# Patient Record
Sex: FEMALE | Race: WHITE | NOT HISPANIC OR LATINO | Employment: UNEMPLOYED | URBAN - METROPOLITAN AREA
[De-identification: names, ages, dates, MRNs, and addresses within clinical notes are randomized per-mention and may not be internally consistent; named-entity substitution may affect disease eponyms.]

---

## 2023-04-06 ENCOUNTER — HOSPITAL ENCOUNTER (OUTPATIENT)
Dept: DATA CONVERSION | Facility: HOSPITAL | Age: 48
End: 2023-04-06
Attending: SURGERY | Admitting: SURGERY
Payer: COMMERCIAL

## 2023-04-06 DIAGNOSIS — D05.12 INTRADUCTAL CARCINOMA IN SITU OF LEFT BREAST: ICD-10-CM

## 2023-04-06 DIAGNOSIS — Z90.12 ACQUIRED ABSENCE OF LEFT BREAST AND NIPPLE: ICD-10-CM

## 2023-04-06 LAB
HEMATOCRIT (%) IN BLOOD BY AUTOMATED COUNT: 40.9 % (ref 36–46)
HEMOGLOBIN (G/DL) IN BLOOD: 12.9 G/DL (ref 12–16)

## 2023-04-14 LAB
COMPLETE PATHOLOGY REPORT: NORMAL
CONVERTED CLINICAL DIAGNOSIS-HISTORY: NORMAL
CONVERTED FINAL DIAGNOSIS: NORMAL
CONVERTED FINAL REPORT PDF LINK TO COPY AND PASTE: NORMAL
CONVERTED GROSS DESCRIPTION: NORMAL

## 2023-06-02 LAB
GENETICS TEST NAME-DATA CONVERSION: NORMAL
LAB MOLECULAR CA TECHNICAL NOTES: NORMAL

## 2023-09-06 VITALS — BODY MASS INDEX: 21.87 KG/M2 | HEIGHT: 67 IN | WEIGHT: 139.33 LBS

## 2023-09-14 NOTE — H&P
"    History & Physical Reviewed:   Pregnant/Lactating:  ·  Are You Pregnant no (1)   ·  Are You Currently Breastfeeding no (1)     I have reviewed the History and Physical dated:  06-Apr-2023   History and Physical reviewed and relevant findings noted. Patient examined to review pertinent physical  findings.: No significant changes   Home Medications Reviewed: no changes noted   Allergies Reviewed: no changes noted       ERAS (Enhanced Recovery After Surgery):  ·  ERAS Patient: no     Consent:   COVID-19 Consent:  ·  COVID-19 Risk Consent Surgeon has reviewed key risks related to the risk of lior COVID-19 and if they contract COVID-19 what the risks are.       Electronic Signatures:  Sofia Quiroz)  (Signed 06-Apr-2023 07:24)   Authored: History & Physical Reviewed, ERAS, Consent,  Note Completion      Last Updated: 06-Apr-2023 07:24 by Sofia Quiroz)    References:  1.  Data Referenced From \"Patient Profile - Preop v3\" 05-Apr-2023 12:52   "

## 2023-10-02 NOTE — OP NOTE
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 left breast partial mastectomy, re-excision   Surgeon: Gabriella   Resident/Fellow/Other Assistant: Beena   Estimated Blood Loss (mL): < 30 cc   Specimen: yes   Findings: clearly defined old seroma cavity     Operative Report Dictated:  Dictation: not applicable - note contains Operative  Report   Operative Report:    OPERATIVE PROCEDURE: Informed consent was obtained. T he surgical site marked and the Day of Surgery Update completed. The patient was taken to the operating room and positioned in a supine position on the operating room table. Anesthesia was induced without difficulty. SCDs were placed for DVT prophylaxis. Antibiotics  were administered within 60 minutes prior to incision for surgical prophylaxis. Lower body Darlin Hugger was applied to help maintain normothermia.     I reviewed my note and the appropriate films.  A surgical safety time-out was performed.     The patient was sterilely prepped and draped in the usual fashion.       I then turned my attention to the left breast. The skin and surrounding  tissue was anesthetized with local anesthetic. Her circumareolar incision was made with a 15 blade scalpel.  Subcutaneous tissues  were sharply divided down to and into subcutaneous fat using Bovie cautery.  I dissected towards the old cavity.  Serous fluid was  encountered and suctioned.  I excised additional lateral margin and oriented the new margin with a stitch.    Hemostasis was achieved with bovie.  The deep dermal layer was  closed with interrupted 3-0 vicryl sutures.  The skin was closed with a running 4-0 monocryl subcuticular stitch.     A sterile dressing was applied.  A surgical bra was used for light compression.     Anesthesia was reversed and the patient was brought to the recovery room in stable condition having tolerated the procedure well.  There were no  complications.  30 cc of 1% lidocaine/0.25%  marcaine mixed was used throughout the case.           Attestation:   Note Completion:  Attending Attestation I performed the procedure without a resident         Electronic Signatures:  Sofia Quiroz)  (Signed 06-Apr-2023 08:13)   Authored: Post Operative Note, Note Completion      Last Updated: 06-Apr-2023 08:13 by Sofia Quiroz)

## 2023-12-04 RX ORDER — LEVONORGESTREL 52 MG/1
1 INTRAUTERINE DEVICE INTRAUTERINE ONCE
COMMUNITY

## 2023-12-04 RX ORDER — NARATRIPTAN 2.5 MG/1
TABLET ORAL ONCE AS NEEDED
COMMUNITY
Start: 2023-01-09

## 2023-12-04 RX ORDER — CEPHALEXIN 500 MG/1
500 TABLET ORAL 3 TIMES DAILY
COMMUNITY
Start: 2023-03-11 | End: 2023-12-20 | Stop reason: WASHOUT

## 2023-12-11 ENCOUNTER — APPOINTMENT (OUTPATIENT)
Dept: SURGICAL ONCOLOGY | Facility: CLINIC | Age: 48
End: 2023-12-11
Payer: COMMERCIAL

## 2023-12-11 ENCOUNTER — APPOINTMENT (OUTPATIENT)
Dept: RADIOLOGY | Facility: CLINIC | Age: 48
End: 2023-12-11
Payer: COMMERCIAL

## 2023-12-18 NOTE — PROGRESS NOTES
*Chief Complaint     FU  Left breast DCIS     History of Present IllnessReferred by PUSHPA moise  PCP jenny freeman MD     48-year-old  female here for FU  Left breast DCIS  She presents today with her .     History:  1) No abnormal mammograms, breast biopsies or breast surgeries  2) November 29, 2022. Bilateral screening mammogram at [facility]. Breast tissue is extremely dense. Right breast negative. Left breast calcifications, BI-RADS 0.  3) December 15, 2022. Left breast diagnostic imaging. Pleomorphic calcifications are noted in the upper outer breast, BI-RADS 4.  4) January 5, 2023. Left breast stereotactic biopsy. Final pathology reveals atypical ductal hyperplasia. Concordant. Surgical consultation is recommended. Open coil hydro-Sony in position  5) 2/9/2023. left breast excisional biopsy. 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 1.2 cm by volume. 3 out of 9 blocks with DCIS. Grade 2. DCIS was split within microns of the posterior margin and less than 1 mm from the lateral margin ER 90%, WI 75%. pTis  6) 3/9/2023. left breast re excision. posterior no residual disease. lateral DCIS at margin.   7) 4/6/2023. left re-excision. residual DCIS more than 2 mm from final margin.   8) completed XRT  9) tamoxifen (jose maria).     Here for FU  BL Mg today     Ob/gyn history  menarche 16  menopause premenopausal. Mirena in place  G 2P3 , age of first delivery 30  brief OCPs, no fertility treatments, no HRT     Paternal grandmother breast cancer in her 60s        Review of Systems  A comprehensive ROS was taken on the patient intake form and reviewed with the patient. This form is scanned into the electronic medical record.     Constitutional symptoms: Denies generalized fatigue. Denies weight change, fevers/chills, difficulty sleeping   Eyes: Denies double vision, glaucoma, cataracts.  Ear/nose/throat/mouth: Denies hearing changes, sore  throat, sinus problems.  Cardiovascular: No chest pain. Denies irregular heartbeat. Denies ankle swelling.  Respiratory: No wheezing, cough, or shortness of breath.  Gastrointestinal: No abdominal pain, No nausea/vomiting. No indigestion/heartburn. No change in bowel habits. No constipation or diarrhea.   Genitourinary: No urinary incontinence. No urinary frequency. No painful urination.  Musculoskeletal: No bone pain, no muscle pain, no joint pain.   Integumentary: No rash. No masses. No changes in moles. No easy bruising.  Neurological: No headaches. No tremors. No numbness/tingling.  Psychiatric: No anxiety. No depression.  Endocrine: No excessive thirst. Not too hot or too cold. Not tired or fatigued.   Hematological/lymphatic: No swollen glands or blood clotting problems. No bruising.      Physical Exam  A chaperone was offered for all portions of the physical exam. The patient declined.      General appearance: appears stated age, alert and oriented x 3  Head: Normocephalic, atraumatic  Eyes: conjunctivae/corneas clear.  Ears: External ears are normal, hearing is grossly intact.  Lungs: normal breathing  Heart: regular   Abdomen: Soft, nontender, nondistended.  Neurologic: grossly normal  Lymph nodes: No cervical, supraclavicular or axillary lymphadenopathy bilaterally     Breast: A comprehensive breast exam was performed in the seated and supine positions. Breasts are symmetrical. Bilateral nipples are everted. There are no skin changes on arm maneuvers. Bra size: A            Right: no masses            Left: no masses. Healed incision. Radiation changes.     Provider Impressions     1) 48-year-old  female here w new left breast DCIS, ER/ID positive. close posterior and lateral margin now s/p left re-excision, persistent positive lateral margin. s/p left PM re-excision, residual DCIS, final margin neg. Completed XRT. Tamoxifen.  2) otherwise healthy. Non-smoker. mirena recent removed.   3) paternal  grandmother with breast cancer in her 60s      *Patient Discussion/Summary     She is here alone. She completed XRT. She is tolerating tamoxifen. Clinical exam is normal. BL MG today is negative for suspicious findings.  She is happy to hear this.  We discussed FU in 1 year after her mammogram.  We can discuss supplemental screening with MRI after that visit. She should call w any sooner concerns.

## 2023-12-20 ENCOUNTER — ANCILLARY PROCEDURE (OUTPATIENT)
Dept: RADIOLOGY | Facility: CLINIC | Age: 48
End: 2023-12-20
Payer: COMMERCIAL

## 2023-12-20 ENCOUNTER — OFFICE VISIT (OUTPATIENT)
Dept: SURGICAL ONCOLOGY | Facility: CLINIC | Age: 48
End: 2023-12-20
Payer: COMMERCIAL

## 2023-12-20 VITALS
WEIGHT: 135 LBS | HEART RATE: 55 BPM | SYSTOLIC BLOOD PRESSURE: 107 MMHG | DIASTOLIC BLOOD PRESSURE: 69 MMHG | BODY MASS INDEX: 21.16 KG/M2

## 2023-12-20 DIAGNOSIS — Z85.3 PERSONAL HISTORY OF MALIGNANT NEOPLASM OF BREAST: ICD-10-CM

## 2023-12-20 DIAGNOSIS — D05.12 DUCTAL CARCINOMA IN SITU (DCIS) OF LEFT BREAST: Primary | ICD-10-CM

## 2023-12-20 DIAGNOSIS — Z85.3 PERSONAL HISTORY OF BREAST CANCER: ICD-10-CM

## 2023-12-20 PROCEDURE — 77062 BREAST TOMOSYNTHESIS BI: CPT

## 2023-12-20 PROCEDURE — 77062 BREAST TOMOSYNTHESIS BI: CPT | Performed by: RADIOLOGY

## 2023-12-20 PROCEDURE — 99214 OFFICE O/P EST MOD 30 MIN: CPT | Performed by: SURGERY

## 2023-12-20 PROCEDURE — 77066 DX MAMMO INCL CAD BI: CPT | Performed by: RADIOLOGY

## 2023-12-20 RX ORDER — TAMOXIFEN CITRATE 10 MG/1
TABLET ORAL DAILY
COMMUNITY

## 2023-12-20 ASSESSMENT — PAIN SCALES - GENERAL: PAINLEVEL: 0-NO PAIN

## 2024-02-16 ENCOUNTER — HOSPITAL ENCOUNTER (OUTPATIENT)
Dept: RADIATION ONCOLOGY | Facility: CLINIC | Age: 49
Setting detail: RADIATION/ONCOLOGY SERIES
Discharge: HOME | End: 2024-02-16
Payer: COMMERCIAL

## 2024-02-16 VITALS
RESPIRATION RATE: 18 BRPM | TEMPERATURE: 97.2 F | SYSTOLIC BLOOD PRESSURE: 129 MMHG | DIASTOLIC BLOOD PRESSURE: 84 MMHG | BODY MASS INDEX: 22.26 KG/M2 | HEART RATE: 57 BPM | WEIGHT: 141.98 LBS | OXYGEN SATURATION: 97 %

## 2024-02-16 DIAGNOSIS — D05.12 DUCTAL CARCINOMA IN SITU (DCIS) OF LEFT BREAST: Primary | ICD-10-CM

## 2024-02-16 PROCEDURE — 99213 OFFICE O/P EST LOW 20 MIN: CPT | Performed by: NURSE PRACTITIONER

## 2024-02-16 ASSESSMENT — PAIN SCALES - GENERAL: PAINLEVEL: 0-NO PAIN

## 2024-02-16 NOTE — PROGRESS NOTES
Radiation Oncology Follow-Up    Patient Name:  Pamella Smith  MRN:  [id number]  :  1975    Referring Provider: No ref. provider found  Primary Care Provider: Lisa Holland MD  Care Team: Patient Care Team:  Lisa Holland MD as PCP - General    Date of Service: 2024      Cancer Staging:          Breast: Ductal Carcinoma in situ         AJCC Edition: 8th (AJCC), Diagnosis Date: 2023, 0, pTis(DCIS) cN0 cM0 G2     Treatment Synopsis:    48-year-old female with ductal carcinoma in situ of the left breast status post left partial mastectomy.     Treatment Summary:     - Routine screening mammogram on 2022.  This revealed grouped calcifications in the upper outer quadrant of the left breast.  Diagnostic views performed 12/15/2022 revealed a small grouping of pleomorphic calcifications in the upper outer quadrant  of the left breast.       - Biopsy performed 2023 revealed atypical ductal hyperplasia.       - On 2023 she underwent a left partial mastectomy.  Pathology revealed a 1.2 cm ductal carcinoma in situ of the cribriform and solid subtypes, nuclear grade 2.  The DCIS was microns from the posterior margin and less than 1 mm from the lateral margin.   Estrogen receptors stained positive at 90%.  Progesterone receptors stained positive at 75%.       - On 3/9/2023 she underwent a reexcision of the posterior margin which was negative.  The new lateral margin contained residual ductal carcinoma in situ with a positive margin.  On 2023 she underwent a second reexcision.  There was residual DCIS although  the margins were negative.      - 23 - 23: Radiation therapy to the left breast consisting of a dose of 42.56 Gy with additional 10 Gy to the tumor bed for a total of 52.56 Gy.  ABC was utilized for cardiac sparing.     - Tamoxifen endocrine therapy for planned 5 yrs    SUBJECTIVE  History of Present Illness:   Pamella Smith is here today for routine radiation follow  up/surveillance visit. She is doing well and getting ready to go on a 2 week trip to [place]. No swelling of left arm or difficulty with ROM. She continues endocrine therapy with tamoxifen and no adverse effects reported. She reports fairly normal menstrual periods. No abnormal bleeding. She had tubal ligation about 16 yrs ago. She is an avid runner and does cross training and in excellent physical shape. She denies headaches, fever, chills, cough, SOB, chest pain, N/V or bony pain. Mammogram in December without evidence of malignancy.     Review of Systems:    Review of Systems   All other systems reviewed and are negative.      Performance Status:   The Karnofsky performance scale today is 100, Fully active, able to carry on all pre-disease performed without restriction (ECOG equivalent 0).      OBJECTIVE  Vital Signs:  /84   Pulse 57   Temp 36.2 °C (97.2 °F) (Core)   Resp 18   Wt 64.4 kg (141 lb 15.6 oz)   SpO2 97%   BMI 22.26 kg/m²      Current Outpatient Medications:     levonorgestrel (Mirena) 21 mcg/24 hours (8 yrs) 52 mg IUD, 52 mg by intrauterine route 1 time., Disp: , Rfl:     naratriptan (Amerge) 2.5 mg tablet, Take by mouth 1 time if needed for headaches or migraine., Disp: , Rfl:     tamoxifen (Nolvadex) 10 mg tablet, Take by mouth once daily.  Take with water or any other nonalcoholic drink with or without food at around the same time(s) every day., Disp: , Rfl:      Physical Exam  Constitutional:       Appearance: Normal appearance.   HENT:      Head: Normocephalic and atraumatic.      Nose: Nose normal.      Mouth/Throat:      Mouth: Mucous membranes are moist.      Pharynx: Oropharynx is clear.   Eyes:      Conjunctiva/sclera: Conjunctivae normal.      Pupils: Pupils are equal, round, and reactive to light.   Cardiovascular:      Rate and Rhythm: Normal rate and regular rhythm.      Heart sounds: Normal heart sounds.   Pulmonary:      Effort: Pulmonary effort is normal.      Breath  sounds: Normal breath sounds.   Chest:   Breasts:     Right: Normal. No inverted nipple, mass, nipple discharge or skin change.      Left: Normal. No swelling, inverted nipple, mass, nipple discharge or skin change.       Abdominal:      General: Abdomen is flat.      Palpations: Abdomen is soft.   Musculoskeletal:         General: No swelling. Normal range of motion.      Cervical back: Normal range of motion and neck supple.   Lymphadenopathy:      Cervical: No cervical adenopathy.      Upper Body:      Right upper body: No supraclavicular or axillary adenopathy.      Left upper body: No supraclavicular or axillary adenopathy.   Skin:     General: Skin is warm and dry.   Neurological:      General: No focal deficit present.      Mental Status: She is alert and oriented to person, place, and time.   Psychiatric:         Mood and Affect: Mood normal.         Behavior: Behavior normal.         ASSESSMENT/PLAN:  49 y.o. female with DCIS of left breast s/p breast conserving surgery followed by radiation. Doing well and cosmesis is excellent. Continues tamoxifen. Mammogram due in December. Radiation follow up in 6 mo.  Call with any questions or concerns.     Pastora Spence CNP  [phone]

## 2024-07-12 DIAGNOSIS — Z86.000 FOLLOW-UP SURVEILLANCE OF DUCTAL CARCINOMA IN SITU OF BREAST: ICD-10-CM

## 2024-07-12 DIAGNOSIS — Z08 FOLLOW-UP SURVEILLANCE OF DUCTAL CARCINOMA IN SITU OF BREAST: ICD-10-CM

## 2024-07-13 DIAGNOSIS — Z08 ENCOUNTER FOR FOLLOW-UP SURVEILLANCE OF BREAST CANCER: Primary | ICD-10-CM

## 2024-07-13 DIAGNOSIS — Z85.3 ENCOUNTER FOR FOLLOW-UP SURVEILLANCE OF BREAST CANCER: Primary | ICD-10-CM

## 2024-07-13 RX ORDER — TAMOXIFEN CITRATE 20 MG/1
20 TABLET ORAL DAILY
Qty: 60 TABLET | Refills: 0 | Status: SHIPPED | OUTPATIENT
Start: 2024-07-13 | End: 2025-07-13

## 2024-07-15 RX ORDER — TAMOXIFEN CITRATE 20 MG/1
20 TABLET ORAL DAILY
Refills: 3 | OUTPATIENT
Start: 2024-07-15

## 2024-08-04 DIAGNOSIS — Z85.3 ENCOUNTER FOR FOLLOW-UP SURVEILLANCE OF BREAST CANCER: ICD-10-CM

## 2024-08-04 DIAGNOSIS — Z08 ENCOUNTER FOR FOLLOW-UP SURVEILLANCE OF BREAST CANCER: ICD-10-CM

## 2024-08-05 RX ORDER — TAMOXIFEN CITRATE 20 MG/1
20 TABLET ORAL DAILY
Qty: 90 TABLET | Refills: 1 | Status: SHIPPED | OUTPATIENT
Start: 2024-08-05 | End: 2025-08-05

## 2024-08-20 DIAGNOSIS — Z08 ENCOUNTER FOR FOLLOW-UP SURVEILLANCE OF BREAST CANCER: ICD-10-CM

## 2024-08-20 DIAGNOSIS — Z85.3 ENCOUNTER FOR FOLLOW-UP SURVEILLANCE OF BREAST CANCER: ICD-10-CM

## 2024-08-20 RX ORDER — TAMOXIFEN CITRATE 20 MG/1
20 TABLET ORAL DAILY
Qty: 90 TABLET | Refills: 3 | Status: SHIPPED | OUTPATIENT
Start: 2024-08-20

## 2024-08-23 ENCOUNTER — HOSPITAL ENCOUNTER (OUTPATIENT)
Dept: RADIATION ONCOLOGY | Facility: CLINIC | Age: 49
Setting detail: RADIATION/ONCOLOGY SERIES
Discharge: HOME | End: 2024-08-23
Payer: COMMERCIAL

## 2024-08-23 VITALS
BODY MASS INDEX: 20.94 KG/M2 | RESPIRATION RATE: 18 BRPM | HEART RATE: 55 BPM | DIASTOLIC BLOOD PRESSURE: 58 MMHG | OXYGEN SATURATION: 97 % | SYSTOLIC BLOOD PRESSURE: 105 MMHG | WEIGHT: 133.6 LBS | TEMPERATURE: 98.1 F

## 2024-08-23 DIAGNOSIS — Z79.810 USE OF TAMOXIFEN (NOLVADEX): Primary | ICD-10-CM

## 2024-08-23 DIAGNOSIS — D05.12 DUCTAL CARCINOMA IN SITU (DCIS) OF LEFT BREAST: ICD-10-CM

## 2024-08-23 PROCEDURE — 99213 OFFICE O/P EST LOW 20 MIN: CPT | Performed by: NURSE PRACTITIONER

## 2024-08-23 ASSESSMENT — PAIN SCALES - GENERAL: PAINLEVEL: 0-NO PAIN

## 2024-08-23 NOTE — PROGRESS NOTES
Radiation Oncology Follow-Up    Patient Name:  Pamella Smith  MRN:  [id number]  :  1975    Referring Provider: Genoveva Spence, APR*  Primary Care Provider: Lisa Holland MD  Care Team: Patient Care Team:  Lisa Holland MD as PCP - General    Date of Service: 2024      Cancer Staging:          Breast: Ductal Carcinoma in situ         AJCC Edition: 8th (AJCC), Diagnosis Date: 2023, 0, pTis(DCIS) cN0 cM0 G2     Treatment Synopsis:    49-year-old female with ductal carcinoma in situ of the left breast status post left partial mastectomy.     Treatment Summary:     - Routine screening mammogram on 2022.  This revealed grouped calcifications in the upper outer quadrant of the left breast.  Diagnostic views performed 12/15/2022 revealed a small grouping of pleomorphic calcifications in the upper outer quadrant  of the left breast.       - Biopsy performed 2023 revealed atypical ductal hyperplasia.       - On 2023 she underwent a left partial mastectomy.  Pathology revealed a 1.2 cm ductal carcinoma in situ of the cribriform and solid subtypes, nuclear grade 2.  The DCIS was microns from the posterior margin and less than 1 mm from the lateral margin.   Estrogen receptors stained positive at 90%.  Progesterone receptors stained positive at 75%.       - On 3/9/2023 she underwent a reexcision of the posterior margin which was negative.  The new lateral margin contained residual ductal carcinoma in situ with a positive margin.  On 2023 she underwent a second reexcision.  There was residual DCIS although  the margins were negative.      - 23 - 23: Radiation therapy to the left breast consisting of a dose of 42.56 Gy with additional 10 Gy to the tumor bed for a total of 52.56 Gy.  ABC was utilized for cardiac sparing.     - Tamoxifen endocrine therapy for planned 5 yrs    SUBJECTIVE  History of Present Illness:   Pamella Smith is here today for routine radiation follow  up/surveillance visit. Doing well and no breast complaints. No swelling of left arm or difficulty with ROM. She continues endocrine therapy with tamoxifen and no adverse effects reported. She reports fairly normal menstrual periods. No abnormal bleeding. She had tubal ligation about 16 yrs ago. She is an avid runner and does cross training and in excellent physical shape. She denies headaches, fever, chills, cough, SOB, chest pain, N/V or bony pain. Mammogram in December without evidence of malignancy.     Review of Systems:    Review of Systems   All other systems reviewed and are negative.    Performance Status:   The Karnofsky performance scale today is 100, Fully active, able to carry on all pre-disease performed without restriction (ECOG equivalent 0).      OBJECTIVE    Current Outpatient Medications:     levonorgestrel (Mirena) 21 mcg/24 hours (8 yrs) 52 mg IUD, 52 mg by intrauterine route 1 time., Disp: , Rfl:     naratriptan (Amerge) 2.5 mg tablet, Take by mouth 1 time if needed for headaches or migraine., Disp: , Rfl:     tamoxifen (Nolvadex) 20 mg tablet, Take 1 tablet (20 mg total) by mouth once daily., Disp: 90 tablet, Rfl: 3     Physical Exam  Constitutional:       Appearance: Normal appearance.   HENT:      Head: Normocephalic and atraumatic.      Nose: Nose normal.      Mouth/Throat:      Mouth: Mucous membranes are moist.      Pharynx: Oropharynx is clear.   Eyes:      Conjunctiva/sclera: Conjunctivae normal.      Pupils: Pupils are equal, round, and reactive to light.   Cardiovascular:      Rate and Rhythm: Normal rate and regular rhythm.      Heart sounds: Normal heart sounds.   Pulmonary:      Effort: Pulmonary effort is normal.      Breath sounds: Normal breath sounds.   Chest:   Breasts:     Right: Normal. No inverted nipple, mass, nipple discharge or skin change.      Left: Normal. No swelling, inverted nipple, mass, nipple discharge or skin change.       Abdominal:      General: Abdomen is  flat.      Palpations: Abdomen is soft.   Musculoskeletal:         General: No swelling. Normal range of motion.      Cervical back: Normal range of motion and neck supple.   Lymphadenopathy:      Cervical: No cervical adenopathy.      Upper Body:      Right upper body: No supraclavicular or axillary adenopathy.      Left upper body: No supraclavicular or axillary adenopathy.   Skin:     General: Skin is warm and dry.   Neurological:      General: No focal deficit present.      Mental Status: She is alert and oriented to person, place, and time.   Psychiatric:         Mood and Affect: Mood normal.         Behavior: Behavior normal.        Narrative & Impression   Interpreted By:  Marcus Cordova,   STUDY:  BI MAMMO BILATERAL DIAGNOSTIC TOMOSYNTHESIS;  12/20/2023 3:17 pm      ACCESSION NUMBER(S):  [id number]      ORDERING CLINICIAN:  DEMETRICE MAYER      INDICATION:  Status post left lumpectomy. No current complaints.      COMPARISON:  10/20/2021, 11/29/2022      FINDINGS:  2D and tomosynthesis images were reviewed at 1 mm slice thickness.      Density:  The breast tissue is extremely dense, which may limit the  sensitivity of mammography.      Postsurgical changes are noted in the left breast. No new suspicious  masses or calcifications are identified.      IMPRESSION:  No mammographic evidence of malignancy.      BI-RADS CATEGORY:      BI-RADS Category:  2 Benign.  Recommendation:  Routine Screening Mammogram in 1 Year.  Recommended Date:  1 Year.  Laterality:  Bilateral.          ASSESSMENT/PLAN:  49 y.o. female with DCIS of left breast s/p breast conserving surgery followed by radiation. Doing well and cosmesis is excellent. Continues tamoxifen. Mammogram due in December. Radiation follow up in 12 mo.  Call with any questions or concerns.     Pastora Spence CNP  [phone]

## 2024-12-17 NOTE — PROGRESS NOTES
*Chief Complaint     FU  Left breast DCIS     History of Present Illness  Referred by PUSHPA moise  PCP jenny freeman MD     49-year-old  female here for FU  Left breast DCIS       History:  1) No abnormal mammograms, breast biopsies or breast surgeries  2) November 29, 2022. Bilateral screening mammogram at [facility]. Breast tissue is extremely dense. Right breast negative. Left breast calcifications, BI-RADS 0.  3) December 15, 2022. Left breast diagnostic imaging. Pleomorphic calcifications are noted in the upper outer breast, BI-RADS 4.  4) January 5, 2023. Left breast stereotactic biopsy. Final pathology reveals atypical ductal hyperplasia. Concordant. Surgical consultation is recommended. Open coil hydro-Sony in position  5) 2/9/2023. left breast excisional biopsy. 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 1.2 cm by volume. 3 out of 9 blocks with DCIS. Grade 2. DCIS was split within microns of the posterior margin and less than 1 mm from the lateral margin ER 90%, CA 75%. pTis  6) 3/9/2023. left breast re excision. posterior no residual disease. lateral DCIS at margin.   7) 4/6/2023. left re-excision. residual DCIS more than 2 mm from final margin.   8) completed XRT  9) tamoxifen (jose maria).  10) 12/23/2024.  BL mammogram BI-RADS 2     Here for FU  BL Mg today  No breast concerns      Ob/gyn history  menarche 16  menopause premenopausal. Mirena in place  G 2P3 , age of first delivery 30  brief OCPs, no fertility treatments, no HRT     Paternal grandmother breast cancer in her 60s        Review of Systems  A comprehensive ROS was taken on the patient intake form and reviewed with the patient. This form is scanned into the electronic medical record.     Constitutional symptoms: Denies generalized fatigue. Denies weight change, fevers/chills, difficulty sleeping   Eyes: Denies double vision, glaucoma, cataracts.  Ear/nose/throat/mouth: Denies  hearing changes, sore throat, sinus problems.  Cardiovascular: No chest pain. Denies irregular heartbeat. Denies ankle swelling.  Respiratory: No wheezing, cough, or shortness of breath.  Gastrointestinal: No abdominal pain, No nausea/vomiting. No indigestion/heartburn. No change in bowel habits. No constipation or diarrhea.   Genitourinary: No urinary incontinence. No urinary frequency. No painful urination.  Musculoskeletal: No bone pain, no muscle pain, no joint pain.   Integumentary: No rash. No masses. No changes in moles. No easy bruising.  Neurological: No headaches. No tremors. No numbness/tingling.  Psychiatric: No anxiety. No depression.  Endocrine: No excessive thirst. Not too hot or too cold. Not tired or fatigued.   Hematological/lymphatic: No swollen glands or blood clotting problems. No bruising.      Physical Exam  A chaperone was offered for all portions of the physical exam. The patient declined.      General appearance: appears stated age, alert and oriented x 3  Head: Normocephalic, atraumatic  Eyes: conjunctivae/corneas clear.  Ears: External ears are normal, hearing is grossly intact.  Lungs: normal breathing  Heart: regular   Abdomen: Soft, nontender, nondistended.  Neurologic: grossly normal  Lymph nodes: No cervical, supraclavicular or axillary lymphadenopathy bilaterally     Breast: A comprehensive breast exam was performed in the seated and supine positions. Breasts are symmetrical. Bilateral nipples are everted. There are no skin changes on arm maneuvers. Bra size: A            Right: no masses            Left: no masses. Healed incision. Radiation changes.     Provider Impressions     1) 49-year-old  female here w new left breast DCIS, ER/MO positive. close posterior and lateral margin now s/p left re-excision, persistent positive lateral margin. s/p left PM re-excision, residual DCIS, final margin neg. Completed XRT. Tamoxifen.  ADI  2) otherwise healthy. Non-smoker. mirena  recent removed.   3) paternal grandmother with breast cancer in her 60s      *Patient Discussion/Summary     She is here alone.  She continues on tamoxifen.  Clinical exam is normal.  Bilateral mammogram today is negative for suspicious findings.  We discussed follow-up.  Bilateral mammogram is due in 1 year.    We discussed the option of fast breast MRI.  This is a 10 minute, self-pay examination that scientific studies have shown to be effective in finding invasive breast cancers.  This is ideal for woman with dense breast tissue with desire additional breast cancer screening, but do not meet criteria to qualify for conventional breast MRI based on lifetime risk of breast cancer.  This is offered at a self-pay cost of $250.  This does not replace routine screening mammogram.    She would like to consider fast MRI.  She will follow-up with the nurse practitioner in 6 months for clinical exam.  If she decides to proceed with fast MRI that can be ordered at that visit.  She should call with any sooner concerns.

## 2024-12-23 ENCOUNTER — HOSPITAL ENCOUNTER (OUTPATIENT)
Dept: RADIOLOGY | Facility: CLINIC | Age: 49
Discharge: HOME | End: 2024-12-23
Payer: COMMERCIAL

## 2024-12-23 ENCOUNTER — OFFICE VISIT (OUTPATIENT)
Dept: SURGICAL ONCOLOGY | Facility: CLINIC | Age: 49
End: 2024-12-23
Payer: COMMERCIAL

## 2024-12-23 VITALS
TEMPERATURE: 96.8 F | SYSTOLIC BLOOD PRESSURE: 124 MMHG | DIASTOLIC BLOOD PRESSURE: 82 MMHG | BODY MASS INDEX: 21.79 KG/M2 | WEIGHT: 139 LBS | HEART RATE: 52 BPM

## 2024-12-23 VITALS — WEIGHT: 133.6 LBS | BODY MASS INDEX: 20.97 KG/M2 | HEIGHT: 67 IN

## 2024-12-23 DIAGNOSIS — D05.12 DUCTAL CARCINOMA IN SITU (DCIS) OF LEFT BREAST: ICD-10-CM

## 2024-12-23 DIAGNOSIS — Z85.3 PERSONAL HISTORY OF BREAST CANCER: ICD-10-CM

## 2024-12-23 DIAGNOSIS — D05.12 DUCTAL CARCINOMA IN SITU (DCIS) OF LEFT BREAST: Primary | ICD-10-CM

## 2024-12-23 PROCEDURE — 77062 BREAST TOMOSYNTHESIS BI: CPT | Performed by: RADIOLOGY

## 2024-12-23 PROCEDURE — 77066 DX MAMMO INCL CAD BI: CPT | Performed by: RADIOLOGY

## 2024-12-23 PROCEDURE — 99214 OFFICE O/P EST MOD 30 MIN: CPT | Performed by: SURGERY

## 2024-12-23 PROCEDURE — 77062 BREAST TOMOSYNTHESIS BI: CPT

## 2024-12-23 PROCEDURE — 1036F TOBACCO NON-USER: CPT | Performed by: SURGERY

## 2024-12-23 ASSESSMENT — PATIENT HEALTH QUESTIONNAIRE - PHQ9
2. FEELING DOWN, DEPRESSED OR HOPELESS: NOT AT ALL
1. LITTLE INTEREST OR PLEASURE IN DOING THINGS: NOT AT ALL
SUM OF ALL RESPONSES TO PHQ9 QUESTIONS 1 & 2: 0

## 2024-12-23 ASSESSMENT — PAIN SCALES - GENERAL: PAINLEVEL_OUTOF10: 0-NO PAIN

## 2025-06-06 NOTE — PROGRESS NOTES
Pamella Smith female   1975 50 y.o.   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 This was diagnosed following excision for atypical ductal hyperplasia. She had positive margins requiring re-excision, final margins negative. She completed post-operative radiation on 2023. May 2023, she underwent genetic testing, 23 gene panel, negative. She started Tamoxifen 20mg daily in 2023, currently taking and tolerating well. She has family history of breast cancer in her paternal grandmother, 60s.     BREAST IMAGIN2024 Bilateral diagnostic mammogram, indicates BI-RADS Category 2.     FEMALE HISTORY: menarche age 16, L3 (set of twins), first birth age 30,  x 2 years, OCP's < 1 year, perimenopausal, Mirena removed in 2023, irregular cycles, history of tubal ligation, extremely dense tissue     FAMILY CANCER HISTORY:  Paternal Grandmother: Breast cancer, 60s      Surgical History  She has a past surgical history that includes Other surgical history (2020); Other surgical history (2020); Other surgical history (2020); and Breast lumpectomy (Left, ).     Social History  She reports that she has never smoked. She has never been exposed to tobacco smoke. She has never used smokeless tobacco. No history on file for alcohol use and drug use.    Family History  Family History[1]     Allergies  Acetaminophen, Penicillins, and Vancomycin    Medications  Current Outpatient Medications   Medication Instructions    naratriptan (Amerge) 2.5 mg tablet Once as needed    tamoxifen (NOLVADEX) 20 mg, oral, Daily         REVIEW OF SYSTEMS    Constitutional:  Negative for appetite change, fatigue, fever and unexpected weight change.   HENT:  Negative for ear  pain, hearing loss, nosebleeds, sore throat and trouble swallowing.    Eyes:  Negative for discharge, itching and visual disturbance.   Respiratory:  Negative for cough, chest tightness and shortness of breath.    Cardiovascular:  Negative for chest pain, palpitations and leg swelling.   Breast: as indicated in HPI  Gastrointestinal:  Negative for abdominal pain, constipation, diarrhea and nausea.   Endocrine: Negative for cold intolerance and heat intolerance.   Genitourinary:  Negative for dysuria, frequency, hematuria, pelvic pain and vaginal bleeding.   Musculoskeletal:  Negative for arthralgias, back pain, gait problem, joint swelling and myalgias.   Skin:  Negative for color change and rash.   Allergic/Immunologic: Negative for environmental allergies and food allergies.   Neurological:  Negative for dizziness, tremors, speech difficulty, weakness, numbness and headaches.   Hematological:  Does not bruise/bleed easily.   Psychiatric/Behavioral:  Negative for agitation, dysphoric mood and sleep disturbance. The patient is not nervous/anxious.         Past Medical History  She has a past medical history of Breast cancer (Multi) and Personal history of irradiation.     Physical Exam  Patient is alert and oriented x3 and in a relaxed and appropriate mood. Her gait is steady and hand grasps are equal. Sclera is clear. The breasts are nearly symmetrical with moderate ptosis. The tissue is dense without palpable abnormalities, discrete nodules or masses. The left breast has a well-healed partial mastectomy incision. The right breast skin and nipple appear normal. The left nipple areolar complex with mild skin thickening secondary to post-operative radiation changes. There is no cervical, supraclavicular or axillary lymphadenopathy. Heart rate and rhythm normal, S1 and S2 appreciated. The lungs are clear to auscultation bilaterally.     Physical Exam  Chest:              Last Recorded Vitals  Vitals:    06/23/25 1319    BP: 116/71   Pulse: 55   Temp: 36.7 °C (98 °F)   SpO2: 97%       Relevant Results   Time was spent discussing digital images of the radiology testing with the patient. I explained the results in depth, along with suggested explanation for follow up recommendations based on the testing results. BI-RADS Category 2    Imaging  Narrative & Impression   Interpreted By:  Carin Pompa,   STUDY:  BI MAMMO BILATERAL DIAGNOSTIC TOMOSYNTHESIS;  12/23/2024 9:29 am      ACCESSION NUMBER(S):  [id number]      ORDERING CLINICIAN:  DEMETRICE MAYER      INDICATION:  History of a left lumpectomy with radiation therapy. Family history  of breast cancer with her paternal grandmother diagnosed.      ,D05.12 Intraductal carcinoma in situ of left breast      COMPARISON:  12/20/2023, 11/29/2022, 10/20/2021, 06/06/2019      FINDINGS:  MAMMOGRAPHY: 2D and tomosynthesis images were reviewed at 1 mm slice  thickness.      Density:  The breasts are extremely dense, which lowers the  sensitivity of mammography.      In the upper-outer quadrant of the left breast at posterior depth  there are surgical clips and scarring at the lumpectomy site. The  lumpectomy site is stable. The parenchymal pattern is stable  bilaterally. No suspicious masses or calcifications are identified.      IMPRESSION:  No mammographic evidence of malignancy. Post lumpectomy changes on  the left. Recommendation is for annual bilateral mammography.      BI-RADS CATEGORY:  BI-RADS Category:  2 Benign.  Recommendation:  Annual Screening.  Recommended Date:  1 Year.  Laterality:  Bilateral.  For any future breast imaging appointments, please call [phone]).         Assessment/Plan   Normal clinical exam, history of left DCIS, Tamoxifen therapy, family history of breast cancer, extremely dense tissue     Plan: Return in December 2025 for bilateral screening mammogram and office visit. Continue Tamoxifen 20mg daily. Discussed Fast MRI for density of tissue. Patient in  agreement, she will schedule Fast MRI now.     Patient Discussion/Summary  Your clinical examination is normal. Please return in December 2025 for bilateral screening mammogram and office visit or sooner if you have any problems or concerns. Continue Tamoxifen 20mg daily.     You can see your health information, review clinical summaries from office visits & test results online when you follow your health with MY  Chart, a personal health record. To sign up go to [email or web address]. If you need assistance with signing up or trouble getting into your account call [facility] Patient Line 24/7 at [phone].    My office phone number is [phone] if you need to get in touch with me or have additional questions or concerns. Thank you for choosing [facility] and trusting me as your healthcare provider. I look forward to seeing you again at your next office visit. I am honored to be a provider on your health care team and I remain dedicated to helping you achieve your health goals.      Shasta Baumann, APRN-CNP         [1]   Family History  Problem Relation Name Age of Onset    Breast cancer Paternal Grandmother  50

## 2025-06-23 ENCOUNTER — OFFICE VISIT (OUTPATIENT)
Dept: SURGICAL ONCOLOGY | Facility: CLINIC | Age: 50
End: 2025-06-23
Payer: COMMERCIAL

## 2025-06-23 VITALS
TEMPERATURE: 98 F | DIASTOLIC BLOOD PRESSURE: 71 MMHG | SYSTOLIC BLOOD PRESSURE: 116 MMHG | WEIGHT: 137.35 LBS | OXYGEN SATURATION: 97 % | BODY MASS INDEX: 21.53 KG/M2 | HEART RATE: 55 BPM

## 2025-06-23 DIAGNOSIS — Z85.3 ENCOUNTER FOR FOLLOW-UP SURVEILLANCE OF BREAST CANCER: ICD-10-CM

## 2025-06-23 DIAGNOSIS — R92.30 DENSE BREAST TISSUE: Primary | ICD-10-CM

## 2025-06-23 DIAGNOSIS — Z08 ENCOUNTER FOR FOLLOW-UP SURVEILLANCE OF BREAST CANCER: ICD-10-CM

## 2025-06-23 PROCEDURE — 99213 OFFICE O/P EST LOW 20 MIN: CPT | Performed by: NURSE PRACTITIONER

## 2025-06-23 ASSESSMENT — PAIN SCALES - GENERAL: PAINLEVEL_OUTOF10: 0-NO PAIN

## 2025-06-23 NOTE — PATIENT INSTRUCTIONS
Your clinical examination is normal. Please return in December 2025 for bilateral screening mammogram and office visit or sooner if you have any problems or concerns. Continue Tamoxifen 20mg daily.     You can see your health information, review clinical summaries from office visits & test results online when you follow your health with MY  Chart, a personal health record. To sign up go to [email or web address]. If you need assistance with signing up or trouble getting into your account call [facility] Patient Line 24/7 at [phone].    My office phone number is [phone] if you need to get in touch with me or have additional questions or concerns. Thank you for choosing [facility] and trusting me as your healthcare provider. I look forward to seeing you again at your next office visit. I am honored to be a provider on your health care team and I remain dedicated to helping you achieve your health goals.

## 2025-07-02 ENCOUNTER — HOSPITAL ENCOUNTER (OUTPATIENT)
Dept: RADIOLOGY | Facility: HOSPITAL | Age: 50
Discharge: HOME | End: 2025-07-02

## 2025-07-02 DIAGNOSIS — Z08 ENCOUNTER FOR FOLLOW-UP SURVEILLANCE OF BREAST CANCER: ICD-10-CM

## 2025-07-02 DIAGNOSIS — Z85.3 ENCOUNTER FOR FOLLOW-UP SURVEILLANCE OF BREAST CANCER: ICD-10-CM

## 2025-07-02 DIAGNOSIS — R92.30 DENSE BREAST TISSUE: ICD-10-CM

## 2025-07-02 PROCEDURE — 2550000001 HC RX 255 CONTRASTS: Performed by: NURSE PRACTITIONER

## 2025-07-02 PROCEDURE — A9575 INJ GADOTERATE MEGLUMI 0.1ML: HCPCS | Performed by: NURSE PRACTITIONER

## 2025-07-02 PROCEDURE — 6100000003 BI MR BREAST BILATERAL WITH CONTRAST FAST SCREENING SELF PAY

## 2025-07-02 RX ORDER — GADOTERATE MEGLUMINE 376.9 MG/ML
13 INJECTION INTRAVENOUS
Status: COMPLETED | OUTPATIENT
Start: 2025-07-02 | End: 2025-07-02

## 2025-07-02 RX ADMIN — GADOTERATE MEGLUMINE 13 ML: 376.9 INJECTION INTRAVENOUS at 11:43

## 2025-09-03 ENCOUNTER — PATIENT MESSAGE (OUTPATIENT)
Dept: SURGICAL ONCOLOGY | Facility: CLINIC | Age: 50
End: 2025-09-03
Payer: COMMERCIAL